# Patient Record
Sex: FEMALE | Race: BLACK OR AFRICAN AMERICAN | URBAN - METROPOLITAN AREA
[De-identification: names, ages, dates, MRNs, and addresses within clinical notes are randomized per-mention and may not be internally consistent; named-entity substitution may affect disease eponyms.]

---

## 2024-08-09 ENCOUNTER — OFFICE VISIT (OUTPATIENT)
Dept: GASTROENTEROLOGY | Facility: CLINIC | Age: 44
End: 2024-08-09

## 2024-08-09 ENCOUNTER — TELEPHONE (OUTPATIENT)
Facility: CLINIC | Age: 44
End: 2024-08-09

## 2024-08-09 VITALS
DIASTOLIC BLOOD PRESSURE: 97 MMHG | BODY MASS INDEX: 29.49 KG/M2 | HEART RATE: 60 BPM | WEIGHT: 177 LBS | SYSTOLIC BLOOD PRESSURE: 142 MMHG | HEIGHT: 65 IN

## 2024-08-09 DIAGNOSIS — Z12.11 SCREENING FOR COLON CANCER: Primary | ICD-10-CM

## 2024-08-09 DIAGNOSIS — K58.0 IRRITABLE BOWEL SYNDROME WITH DIARRHEA: ICD-10-CM

## 2024-08-09 DIAGNOSIS — K52.9 CHRONIC DIARRHEA: ICD-10-CM

## 2024-08-09 DIAGNOSIS — R11.0 NAUSEA: ICD-10-CM

## 2024-08-09 DIAGNOSIS — R19.7 DIARRHEA, UNSPECIFIED TYPE: Primary | ICD-10-CM

## 2024-08-09 DIAGNOSIS — R19.4 CHANGE IN BOWEL HABITS: ICD-10-CM

## 2024-08-09 PROCEDURE — 99204 OFFICE O/P NEW MOD 45 MIN: CPT | Performed by: INTERNAL MEDICINE

## 2024-08-09 RX ORDER — CYCLOBENZAPRINE HCL 5 MG
1 TABLET ORAL 3 TIMES DAILY PRN
COMMUNITY
Start: 2024-04-26

## 2024-08-09 RX ORDER — DULOXETIN HYDROCHLORIDE 60 MG/1
60 CAPSULE, DELAYED RELEASE ORAL DAILY
COMMUNITY
Start: 2024-03-25

## 2024-08-09 RX ORDER — IBUPROFEN 600 MG/1
1 TABLET ORAL EVERY 6 HOURS PRN
COMMUNITY
Start: 2024-01-16

## 2024-08-09 RX ORDER — ACETAMINOPHEN 325 MG/1
2 TABLET ORAL EVERY 4 HOURS PRN
COMMUNITY
Start: 2024-01-16

## 2024-08-09 RX ORDER — CELECOXIB 100 MG/1
1 CAPSULE ORAL 2 TIMES DAILY PRN
COMMUNITY
Start: 2024-05-21

## 2024-08-09 RX ORDER — ONDANSETRON 4 MG/1
4 TABLET, ORALLY DISINTEGRATING ORAL
COMMUNITY
Start: 2024-03-25

## 2024-08-09 RX ORDER — TAMOXIFEN CITRATE 20 MG/1
20 TABLET ORAL DAILY
COMMUNITY
Start: 2024-04-18

## 2024-08-09 RX ORDER — SODIUM, POTASSIUM,MAG SULFATES 17.5-3.13G
SOLUTION, RECONSTITUTED, ORAL ORAL
Qty: 1 EACH | Refills: 0 | Status: SHIPPED | OUTPATIENT
Start: 2024-08-09

## 2024-08-09 NOTE — PROGRESS NOTES
** Scroll down for HPI. **    ASSESSMENT/PLAN:     43 year old woman fit and healthy with BMI 29.45 who is recovering from a complex recent history of early breast cancer.    She underwent a mastectomy surgery January 2024 with onset of postsurgical pain approximately February 2024.  Pain has persisted despite nerve block treatments and medications including gabapentin.    She is on tamoxifen therapy.    Just came in about 3 weeks ago, July 2024 for removal of the expander, placement of breast implant.    The postoperative pain continues to be significant, but improved after the most recent block.  She continues to require gabapentin for control of her pain, recently unable to step down from the 900 mg dose to the 600 mg dose.    Today Ms. Hart comes in to review a change in bowel patterns, abdominal syndrome that preceded the breast cancer diagnosis.      Suggest:    Change in bowel patterns with recurring abdominal cramping and bloating, watery diarrhea  Symptom onset over 1 year ago  Recently treated for early breast cancer after that, mastectomy surgery January 2024  Aware of dairy intolerance and avoiding  Currently taking medications for nausea and pain including gabapentin  Some recent weight loss likely less than 10 pounds  Today we discussed possibilities ranging from irritable bowel syndrome-diarrhea which would be most likely to infectious or inflammatory process.  Stool studies ordered today including stool C. difficile, fecal calprotectin, Giardia assay  Trial of rifaximin antibiotic for IBS-diarrhea; Rx sent in today  Schedule colonoscopy examination to evaluate for inflammatory process.    2.  Nausea due to the above  Ms. Hart reports good relief on ondansetron medication.  Continue as needed.    3.  Colon cancer screening, average risk  No family history colorectal cancer  Never smoker  Colonoscopy examination as above and then likely in 5 years        Consent:    I recommended colonoscopy  examination with possible biopsy, possible polypectomy under MAC anesthesia . We discussed the nature and risks of colonoscopy examination including sedation, anesthesia risks; bleeding, colonic injury or perforation, infection.  We discussed the rare occurrence of missed lesions including missed polyps or missed malignancy with colonoscopy examination.  The patient understood these risks and agrees to proceed.  The need for an accompanying adult to provide a ride home or escort home was also discussed.    Suprep bowel prep if covered; otherwise GoLYTELY bowel prep          Office visit 8/9/2024:  HPI:    Patient ID: Xenia Hart is a 43 year old woman fit and healthy with BMI 29.45 who is recovering from a complex recent history of early breast cancer.    She underwent a mastectomy surgery January 2024 with onset of postsurgical pain approximately February 2024.  Pain has persisted despite nerve block treatments and medications including gabapentin.    She is on tamoxifen therapy.    Just came in about 3 weeks ago, July 2024 for removal of the expander, placement of breast implant.    The postoperative pain continues to be significant, but improved after the most recent block.  She continues to require gabapentin for control of her pain, recently unable to step down from the 900 mg dose to the 600 mg dose.    Today Ms. Hart comes in to review a change in bowel patterns, abdominal syndrome that preceded the breast cancer diagnosis.    Today Ms. Hart describes over 1 year history of waxing and waning diarrhea with associated abdominal cramping and bloating.    She describes watery diarrhea about 3-5 days/week.    On the days of diarrhea, this can be 1 or 2 watery bowel movements per day or repeated watery bowel movements all day long.    Mucus on the toilet paper, occasional streaks of blood in the toilet paper but no rafaela hematochezia.    Recent decrease appetite and intake.  Unclear whether this could relate  to other medications including gabapentin, tamoxifen.    Slight weight loss, from previous baseline 1  LBS to 177 on YN today.    On review of systems, she describes frequent nausea.  This is well-controlled on the Zofran.  There has been concern for iron deficiency anemia.    Ms. Hart did undergo a previous colonoscopy examination approximately age 27.  This is for an acute severe illness by her recollection.  Apparently colonoscopy was reassuring.  Illness did gradually improve.  She remembers it was a very slow recovery.    Family history includes a cousin with possible Crohn's disease.    Never smoker.      Recent labs have been reassuring.    CBC [facility] 6/24/2024 shows WBC 7100, hemoglobin 12.2g  BMP [facility] 6/24/2024 shows normal electrolytes and renal function  Hepatic function panel [facility] 6/24/2024 normal with AST 19 ALT 22 alk phos 72 serum albumin 4.2; normal serum lipase  Last iron profile 8/16/2023 showed iron deficiency    Wt Readings from Last 20 Encounters:   08/09/24 177 lb (80.3 kg)         Previous EGD examination: n  Previous colonoscopy(ies): Approx 2007    HPI    Review of Systems    ======================    [facility]    Yakov Florez MD - 03/13/2024    PROCEDURE:  CT CHEST W CONTRAST.    HISTORY:  Chest wall pain following a mastectomy and tissue expander placement.    TECHNIQUE:  Non-ionic contrast enhanced helical thoracic CT was performed.    COMPARISON:  None    FINDINGS:      Support Devices:  None.    Heart/Pericardium/Great Vessels:  Cardiac size is normal.  Calcific coronary artery atherosclerosis cannot be evaluated on this contrast enhanced exam.  There is no pericardial effusion.  The thoracic aorta is normal in diameter.  The main pulmonary artery is normal in diameter.    Pleural Spaces:  The pleural spaces are clear.    Mediastinum/Deepika:  There is no mediastinal or hilar lymph node  enlargement.    Neck Base/Chest Wall/Diaphragm/Upper Abdomen:  There is no supraclavicular or axillary lymph node enlargement.  Mild degenerative change is present in the spine. There are postsurgical changes of left mastectomy and bilateral tissue expander placement. There are surgical clips in the left chest wall and axilla. There is skin thickening of the left chest wall, which may represent postsurgical edema. No drainable fluid collection is identified. Few scattered diverticula of the visualized left colon.    Lungs/Central Airways:  The trachea and central airways are clear. There is no focal consolidation. No suspicious lung nodule.    CONCLUSIONS:      1.  Postsurgical changes of left mastectomy and bilateral tissue expander placement. Skin thickening of the left chest wall with underlying mild fat stranding, which may represent postsurgical edema but cellulitis is difficult to exclude. No drainable fluid collection identified.    FINAL REPORT  THE ATTENDING RADIOLOGIST INTERPRETED THIS STUDY WITH THE RESIDENT  WHOSE NAME APPEARS BELOW, AND FULLY AGREES WITH THE REPORT  AND HAS AMENDED THE REPORT WHEN NECESSARY:  Attending Radiologist:  Yakov Florez  Radiology Resident:  Azalea Smith MD  Date Signed Off:  03/13/2024 11:11  Exam End: 03/12/24  4:30 PM     Received From: [facility]    ======    [facility]    Shey Arias MD - 06/24/2024    PROCEDURE:  CTA CHEST PE.    HISTORY:  Evaluate for pulmonary embolism.  Syncope. History of breast cancer, status post mastectomy.    TECHNIQUE:  Initial pre-contrast  and localizer images were obtained.  Contrast enhanced helical CT pulmonary angiography was then performed.        COMPARISON:  CT chest with contrast 3/12/2024.    PULMONARY CTA FINDINGS:  This is a diagnostic quality helical CT pulmonary angiogram.  There is no acute pulmonary embolism to the first subsegmental pulmonary artery level.    OTHER  FINDINGS:      Support Devices:  None.    Heart/Pericardium/Great Vessels:         Cardiac size is normal.       Calcific coronary artery atherosclerosis cannot be accurately evaluated on this contrast-enhanced exam.       There is no pericardial effusion.       The thoracic aorta is normal in course and caliber.       The main pulmonary artery is normal in diameter.    Pleural Spaces:  The pleural spaces are clear.    Mediastinum/Deepika:  There is no mediastinal or hilar lymph node enlargement.    Neck Base/Chest Wall/Diaphragm/Upper Abdomen:  Status post left mastectomy with tissue expander in place.  Patient is also status post right breast augmentation with subpectoral prosthesis in place.  There is decreased skin thickening in the left chest wall and left breast, may be postsurgical in etiology. Multiple surgical clips in the left axillary region. Mild degenerative change is present in the spine.    Lungs/Central Airways:  The trachea and central airways are clear. No focal consolidation. No suspicious pulmonary nodule.      CONCLUSIONS:      1.  No acute pulmonary embolism to the first subsegmental pulmonary artery level.    2.  Lungs clear.    3.  Status post left mastectomy (with tissue expander in place) and right breast augmentation.  Decreased skin thickening in the left chest wall and left breast.  No fluid collection.        FINAL REPORT  THE ATTENDING RADIOLOGIST INTERPRETED THIS STUDY WITH THE RESIDENT  WHOSE NAME APPEARS BELOW, AND FULLY AGREES WITH THE REPORT  AND HAS AMENDED THE REPORT WHEN NECESSARY:  Attending Radiologist:  Shey Arias MD  Radiology Resident:  Faustino Robb MD  Date Signed Off:  06/24/2024 20:04  Exam End: 06/24/24  7:03 PM     Received From: [facility]              No current outpatient medications on file.         Allergies:  Allergies   Allergen Reactions    Latex HIVES     Imaging: No results found.       PHYSICAL EXAM:   Physical Exam                    Over 45 minutes spent today discussing the above and counseling and educating this patient, reviewing chart notes and data and documenting clinical information in the EHR, independently interpreting results and communicating results to the patient/family and composing this comprehensive note, ordering medications or testing, referring and communicating with other healthcare providers, and care coordination with the patient's other providers.      Digital transcription software was utilized to produce this note. The note was proofread for content only. Typographical errors may remain.       Meds This Visit:  Requested Prescriptions      No prescriptions requested or ordered in this encounter       Imaging & Referrals:  None       ID#[id number]

## 2024-08-09 NOTE — TELEPHONE ENCOUNTER
Attn PPD Prior Auth:    Patient is scheduled for Colonoscopy 14831 on 8/13/2024. Please obtain prior authorization.    Thanks!

## 2024-08-09 NOTE — PATIENT INSTRUCTIONS
GI schedulers -    Please schedule colonoscopy exam at [facility] ([facility])    BMI Readings from Last 1 Encounters:   08/09/24 29.45 kg/m²     MAC anesthesia     BP Readings from Last 5 Encounters:   08/09/24 (!) 142/97       Suprep small volume bowel prep if covered by insurance, otherwise   Golytely (PEG) 4L bowel prep  Rx sent in to [facility]      DX = Screening colonoscopy, change bowel habits, chronic diarrhea    Medication instructions:    None

## 2024-08-09 NOTE — TELEPHONE ENCOUNTER
Scheduled for:  Colonoscopy 75422  Provider Name:    Date:  8/13/2024  Location:  [facility]  Sedation:  MAC  Time:  8:00 am, (pt is aware that Endoscopy desk will call the day before to confirm arrival time)  Prep: Golytely   Meds/Allergies Reconciled?:  Physician reviewed  Diagnosis with codes:  Screening for Colon Cancer Z12.11, Change In Bowel Habits R19.4, Chronic Diarrhea K52.9  Was patient informed to call insurance with codes (Y/N):  Yes  Referral sent?:  Referral was sent at the time of electronic surgical scheduling.  [facility] or [facility] notified?:   I sent an electronic request to Endo Scheduling and received a confirmation today.  Medication Orders:  Patient is aware to NOT take iron pills, herbal meds and diet supplements for 7 days before exam. Also to NOT take any form of alcohol, recreational drugs and any forms of ED meds 24-72 hours before exam.   Misc Orders:  N/A     Further instructions given by staff: I discussed the prep instructions with the patient which she verbally understood. Provided patient with prep instructions and cancellation policy at the time of office visit.

## 2024-08-13 ENCOUNTER — HOSPITAL ENCOUNTER (OUTPATIENT)
Facility: HOSPITAL | Age: 44
Setting detail: HOSPITAL OUTPATIENT SURGERY
Discharge: HOME | End: 2024-08-13
Attending: INTERNAL MEDICINE | Admitting: INTERNAL MEDICINE
Payer: COMMERCIAL

## 2024-08-13 ENCOUNTER — ANESTHESIA (OUTPATIENT)
Dept: ENDOSCOPY | Facility: HOSPITAL | Age: 44
End: 2024-08-13
Payer: COMMERCIAL

## 2024-08-13 ENCOUNTER — ANESTHESIA EVENT (OUTPATIENT)
Dept: ENDOSCOPY | Facility: HOSPITAL | Age: 44
End: 2024-08-13
Payer: COMMERCIAL

## 2024-08-13 VITALS
OXYGEN SATURATION: 98 % | HEIGHT: 65 IN | RESPIRATION RATE: 22 BRPM | TEMPERATURE: 98 F | HEART RATE: 71 BPM | BODY MASS INDEX: 29.49 KG/M2 | WEIGHT: 177 LBS | DIASTOLIC BLOOD PRESSURE: 88 MMHG | SYSTOLIC BLOOD PRESSURE: 105 MMHG

## 2024-08-13 DIAGNOSIS — Z12.11 SCREENING FOR COLON CANCER: ICD-10-CM

## 2024-08-13 DIAGNOSIS — R19.4 CHANGE IN BOWEL HABITS: ICD-10-CM

## 2024-08-13 DIAGNOSIS — K52.9 CHRONIC DIARRHEA: ICD-10-CM

## 2024-08-13 LAB — B-HCG UR QL: NEGATIVE

## 2024-08-13 PROCEDURE — 0DBN8ZX EXCISION OF SIGMOID COLON, VIA NATURAL OR ARTIFICIAL OPENING ENDOSCOPIC, DIAGNOSTIC: ICD-10-PCS | Performed by: INTERNAL MEDICINE

## 2024-08-13 PROCEDURE — 0DBP8ZX EXCISION OF RECTUM, VIA NATURAL OR ARTIFICIAL OPENING ENDOSCOPIC, DIAGNOSTIC: ICD-10-PCS | Performed by: INTERNAL MEDICINE

## 2024-08-13 PROCEDURE — 0DBL8ZX EXCISION OF TRANSVERSE COLON, VIA NATURAL OR ARTIFICIAL OPENING ENDOSCOPIC, DIAGNOSTIC: ICD-10-PCS | Performed by: INTERNAL MEDICINE

## 2024-08-13 PROCEDURE — 45380 COLONOSCOPY AND BIOPSY: CPT | Performed by: INTERNAL MEDICINE

## 2024-08-13 PROCEDURE — 0DBB8ZX EXCISION OF ILEUM, VIA NATURAL OR ARTIFICIAL OPENING ENDOSCOPIC, DIAGNOSTIC: ICD-10-PCS | Performed by: INTERNAL MEDICINE

## 2024-08-13 RX ORDER — NALOXONE HYDROCHLORIDE 0.4 MG/ML
0.08 INJECTION, SOLUTION INTRAMUSCULAR; INTRAVENOUS; SUBCUTANEOUS ONCE AS NEEDED
Status: DISCONTINUED | OUTPATIENT
Start: 2024-08-13 | End: 2024-08-13

## 2024-08-13 RX ORDER — LIDOCAINE HYDROCHLORIDE 10 MG/ML
INJECTION, SOLUTION EPIDURAL; INFILTRATION; INTRACAUDAL; PERINEURAL AS NEEDED
Status: DISCONTINUED | OUTPATIENT
Start: 2024-08-13 | End: 2024-08-13 | Stop reason: SURG

## 2024-08-13 RX ORDER — SODIUM CHLORIDE, SODIUM LACTATE, POTASSIUM CHLORIDE, CALCIUM CHLORIDE 600; 310; 30; 20 MG/100ML; MG/100ML; MG/100ML; MG/100ML
INJECTION, SOLUTION INTRAVENOUS CONTINUOUS
Status: DISCONTINUED | OUTPATIENT
Start: 2024-08-13 | End: 2024-08-13

## 2024-08-13 RX ADMIN — LIDOCAINE HYDROCHLORIDE 50 MG: 10 INJECTION, SOLUTION EPIDURAL; INFILTRATION; INTRACAUDAL; PERINEURAL at 08:03:00

## 2024-08-13 RX ADMIN — SODIUM CHLORIDE, SODIUM LACTATE, POTASSIUM CHLORIDE, CALCIUM CHLORIDE: 600; 310; 30; 20 INJECTION, SOLUTION INTRAVENOUS at 08:03:00

## 2024-08-13 RX ADMIN — SODIUM CHLORIDE, SODIUM LACTATE, POTASSIUM CHLORIDE, CALCIUM CHLORIDE: 600; 310; 30; 20 INJECTION, SOLUTION INTRAVENOUS at 08:33:00

## 2024-08-13 NOTE — H&P
History & Physical Examination    Patient Name: Xenia Hart  MRN: [id number]  [facility]: [id number]  YOB: 1980    Diagnosis: Screening colonoscopy examination, change in bowel habits, chronic diarrhea    PRESENT ILLNESS: Strong healthy 43-year-old woman BMI 29.5 recent history of breast cancer who presents for elective colonoscopy examination.      BMI Readings from Last 1 Encounters:   08/10/24 29.45 kg/m²         Medications Prior to Admission   Medication Sig Dispense Refill Last Dose    acetaminophen 325 MG Oral Tab Take 2 tablets (650 mg total) by mouth every 4 (four) hours as needed.    at prn    celecoxib 100 MG Oral Cap Take 1 capsule (100 mg total) by mouth 2 (two) times daily as needed.   8/12/2024    cyclobenzaprine 5 MG Oral Tab Take 1 tablet (5 mg total) by mouth 3 (three) times daily as needed.       DULoxetine 60 MG Oral Cap DR Particles Take 1 capsule (60 mg total) by mouth daily.       Gabapentin, Once-Daily, 900 MG Oral Tab Take 900 mg by mouth As Directed.   8/12/2024    ibuprofen 600 MG Oral Tab Take 1 tablet (600 mg total) by mouth every 6 (six) hours as needed.       ondansetron 4 MG Oral Tablet Dispersible Take 1 tablet (4 mg total) by mouth.       tamoxifen 20 MG Oral Tab Take 1 tablet (20 mg total) by mouth daily.   8/12/2024    rifAXIMin 550 MG Oral Tab Take 1 tablet (550 mg total) by mouth 3 (three) times daily for 14 days. 42 tablet 1     Na Sulfate-K Sulfate-Mg Sulf (SUPREP BOWEL PREP KIT) 17.5-3.13-1.6 GM/177ML Oral Solution Take as directed 1 each 0      Current Facility-Administered Medications   Medication Dose Route Frequency    lactated ringers infusion   Intravenous Continuous       Allergies:   Allergies   Allergen Reactions    Latex HIVES       Past Medical History:    Breast cancer (HCC)    PONV (postoperative nausea and vomiting)    Post-mastectomy pain syndrome     Past Surgical History:   Procedure Laterality Date    Knee surgery Right 2010    Mastectomy  left  01/2024     Family History   Problem Relation Age of Onset    Other (Other) Father     Other (Other) Mother     Other (Other) Brother     Other (Other) Brother      Social History     Tobacco Use    Smoking status: Never     Passive exposure: Never    Smokeless tobacco: Never   Substance Use Topics    Alcohol use: Never       SYSTEM Check if Review is Normal Check if Physical Exam is Normal If not normal, please explain:   HEENT [ ] [X ]    NECK & BACK [ ] [ ]    HEART [ X ] [ X ]    LUNGS [ X ] [ X ]    ABDOMEN [ X ] [ X ]    UROGENITAL [ ] [ ]    EXTREMITIES [ ] [ ]    OTHER        See Anesthesia documentation    [ x ] I have discussed the risks and benefits and alternatives with the patient/family.  They understand and agree to proceed with plan of care.  [ x ] I have reviewed the History and Physical done within the last 30 days.  Any changes noted above.    Ac Antonio MD  8/13/2024  7:58 AM

## 2024-08-13 NOTE — DISCHARGE INSTRUCTIONS
.  .  .  Notes from Dr. Antonio:    Healthy colonoscopy examination today.  I did not see any colitis or inflammation.  I took random samples or biopsies of the lining of your \"ileum\" or end of the small intestine as well as the lining of the colon or large intestine.  That is to look for celiac disease or colitis/inflammation.  I saw a very mild early \"diverticuLOSIS\" (pockets) of the wall of the colon - very common. You should be provided a handout regarding diet and the possible risk of diverticulitis/infection  Very small internal hemorrhoids only on today's colonoscopy exam.  Those may cause occasional blood streaks but are not a problem.  NO colon polyps today.  Great news.  If you are raw and irritated back there after all of that diarrhea and wiping, three good options to soothe and heal the irritation include Aquaphor ointment, \"Desitin\" or \"A & D\" diaper ointment, or good old-fashioned Vaseline.  All of these soothe and create a protective barrier so that your skin can heal.  Please proceed with a trial of that rifaximin antibiotic to see if that helps with the diarrhea.  Repeat colonoscopy exam or other colon cancer screening in 5 years.  .  .  .

## 2024-08-13 NOTE — ANESTHESIA POSTPROCEDURE EVALUATION
Patient: Xenia Torreston    Procedure Summary       Date: 08/13/24 Room / Location: [facility] ENDOSCOPY 05 / [facility] ENDOSCOPY    Anesthesia Start: 0757 Anesthesia Stop:     Procedure: COLONOSCOPY Diagnosis:       Screening for colon cancer      Change in bowel habits      Chronic diarrhea      (Screening for colon cancer/ Change in bowel habits/ Chronic diarrhea)    Surgeons: Ac Antonio MD Anesthesiologist: Jassi Dennis CRNA    Anesthesia Type: MAC ASA Status: 2            Anesthesia Type: MAC    Vitals Value Taken Time   /58 08/13/24 0836   Temp 98 °F (36.7 °C) 08/13/24 0836   Pulse 71 08/13/24 0837   Resp 22 08/13/24 0837   SpO2 100 % 08/13/24 0837   Vitals shown include unfiled device data.    [facility] AN Post Evaluation:   Patient Evaluated in PACU  Patient Participation: complete - patient participated  Level of Consciousness: sleepy but conscious  Pain Score: 0  Pain Management: adequate  Airway Patency:patent  Dental exam unchanged from preop  Yes    Cardiovascular Status: stable and acceptable  Respiratory Status: acceptable and room air  Postoperative Hydration acceptable      JASSI DENNIS CRNA  8/13/2024 8:37 AM

## 2024-08-13 NOTE — OPERATIVE REPORT
[facility]    COLONOSCOPY PROCEDURE REPORT     DATE OF PROCEDURE:  8/13/2024     PCP: Cara Reyes MD     PREOPERATIVE DIAGNOSIS:  Screening colonoscopy examination, change in bowel habits, chronic diarrhea     POSTOPERATIVE DIAGNOSIS:  See impression.     SURGEON:  Ac Antonio M.D.     SEDATION:    MAC anesthesia provided by the Anesthesia Service.  MAC anesthesia requested due to  anticipated intolerance of colonoscopy examination under safe doses conscious sedation medications     COLONOSCOPY PROCEDURE:   After the nature and risks of colonoscopy examination under MAC anesthesia were discussed with the patient and all questions answered, informed consent was obtained.  The patient was sedated as above.      Digital rectal exam was performed which showed no masses.  The Olympus pediatric video colonoscope was placed in the patient's rectum and advanced under direct visualization through the entire length of the colon up to the cecum and terminal ileum.  Retroflex exam performed up the ascending colon to the hepatic flexure.  The cecum was confirmed by landmarks including appendiceal orifice, cecal trifold, ileocecal valve.  Retroflexion was performed in the rectum.    The quality of the prep was excellent.    Estimated blood loss: none/insignificant       COLONOSCOPY FINDINGS:    Healthy and normal colonic mucosa entire length of the colon from the anal verge up to the cecum and ileocecal valve.  Random colonic mucosal biopsies obtained transverse colon and sigmoid colon.  Normal ICV and terminal ileum mucosa examined at least 15 cm proximal to the ileocecal valve.  Random ileal mucosal biopsies obtained.  Occasional, rare diverticuli ascending and sigmoid colon.  Medium sized internal hemorrhoids.      RECOMMENDATIONS:  High fiber diet.  Follow-up above random colonic and ileal mucosal biopsies.    Repeat colonoscopy examination in 5 years for colon cancer screening.  No aspirin or  NSAID medications for next 5 days to prevent bleeding.  Proceed with trial of rifaximin antibiotic as discussed last week during our consultation for the recent chronic diarrhea.

## 2024-08-13 NOTE — ANESTHESIA PREPROCEDURE EVALUATION
Anesthesia PreOp Note    HPI:     Xenia Hart is a 43 year old female who presents for preoperative consultation requested by: Ac Antonio MD    Date of Surgery: 8/13/2024    Procedure(s):  COLONOSCOPY  Indication: Screening for colon cancer/ Change in bowel habits/ Chronic diarrhea    Relevant Problems   No relevant active problems       NPO:  Last Liquid Consumption Date: 08/13/24  Last Liquid Consumption Time: 0300  Last Solid Consumption Date: 08/12/24  Last Solid Consumption Time: 0800  Last Liquid Consumption Date: 08/13/24          History Review:  There are no problems to display for this patient.      Past Medical History:    Breast cancer (HCC)    PONV (postoperative nausea and vomiting)    Post-mastectomy pain syndrome       Past Surgical History:   Procedure Laterality Date    Knee surgery Right 2010    Mastectomy left  01/2024       Medications Prior to Admission   Medication Sig Dispense Refill Last Dose    acetaminophen 325 MG Oral Tab Take 2 tablets (650 mg total) by mouth every 4 (four) hours as needed.    at prn    celecoxib 100 MG Oral Cap Take 1 capsule (100 mg total) by mouth 2 (two) times daily as needed.   8/12/2024    cyclobenzaprine 5 MG Oral Tab Take 1 tablet (5 mg total) by mouth 3 (three) times daily as needed.       DULoxetine 60 MG Oral Cap DR Particles Take 1 capsule (60 mg total) by mouth daily.       Gabapentin, Once-Daily, 900 MG Oral Tab Take 900 mg by mouth As Directed.   8/12/2024    ibuprofen 600 MG Oral Tab Take 1 tablet (600 mg total) by mouth every 6 (six) hours as needed.       ondansetron 4 MG Oral Tablet Dispersible Take 1 tablet (4 mg total) by mouth.       tamoxifen 20 MG Oral Tab Take 1 tablet (20 mg total) by mouth daily.   8/12/2024    rifAXIMin 550 MG Oral Tab Take 1 tablet (550 mg total) by mouth 3 (three) times daily for 14 days. 42 tablet 1     Na Sulfate-K Sulfate-Mg Sulf (SUPREP BOWEL PREP KIT) 17.5-3.13-1.6 GM/177ML Oral Solution Take  as directed 1 each 0      Current Facility-Administered Medications Ordered in Epic   Medication Dose Route Frequency Provider Last Rate Last Admin    lactated ringers infusion   Intravenous Continuous Ac Antonio MD         No current [facility]-ordered outpatient medications on file.       Allergies   Allergen Reactions    Latex HIVES       Family History   Problem Relation Age of Onset    Other (Other) Father     Other (Other) Mother     Other (Other) Brother     Other (Other) Brother      Social History     Socioeconomic History    Marital status: Single   Tobacco Use    Smoking status: Never     Passive exposure: Never    Smokeless tobacco: Never   Substance and Sexual Activity    Alcohol use: Never    Drug use: Yes     Types: Cannabis     Comment: last use sunday 8/11       Available pre-op labs reviewed.             Vital Signs:  Body mass index is 29.45 kg/m².   height is 1.651 m (5' 5\") and weight is 80.3 kg (177 lb). Her blood pressure is 136/81 and her pulse is 67. Her respiration is 15 and oxygen saturation is 100%.   Vitals:    08/10/24 0913 08/13/24 0727   BP:  136/81   Pulse:  67   Resp:  15   SpO2:  100%   Weight: 80.3 kg (177 lb)    Height: 1.651 m (5' 5\")         Anesthesia Evaluation     Patient summary reviewed and Nursing notes reviewed    History of anesthetic complications (nausea)   Airway   Mallampati: I  TM distance: >3 FB  Neck ROM: full  Dental - Dentition appears grossly intact     Pulmonary - negative ROS and normal exam    breath sounds clear to auscultation  Cardiovascular - negative ROS and normal exam    Neuro/Psych - negative ROS     GI/Hepatic/Renal - negative ROS     Endo/Other - negative ROS   Abdominal  - normal exam                 Anesthesia Plan:   ASA:  2  Plan:   MAC  Informed Consent Plan and Risks Discussed With:  Patient  Discussed plan with:  Surgeon      I have informed Xenia Hart and/or legal guardian or family member of the nature of the  anesthetic plan, benefits, risks including possible dental damage if relevant, major complications, and any alternative forms of anesthetic management.   All of the patient's questions were answered to the best of my ability. The patient desires the anesthetic management as planned.  JASSI CASIANO CRNA  8/13/2024 7:34 AM  Present on Admission:  **None**

## 2024-09-12 ENCOUNTER — LAB ENCOUNTER (OUTPATIENT)
Dept: LAB | Facility: REFERENCE LAB | Age: 44
End: 2024-09-12
Attending: INTERNAL MEDICINE
Payer: COMMERCIAL

## 2024-09-12 DIAGNOSIS — R19.7 DIARRHEA, UNSPECIFIED TYPE: ICD-10-CM

## 2024-09-12 LAB
CRYPTOSP AG STL QL IA: NEGATIVE
G LAMBLIA AG STL QL IA: NEGATIVE

## 2024-09-12 PROCEDURE — 87329 GIARDIA AG IA: CPT

## 2024-09-12 PROCEDURE — 83993 ASSAY FOR CALPROTECTIN FECAL: CPT

## 2024-09-12 PROCEDURE — 87272 CRYPTOSPORIDIUM AG IF: CPT

## 2024-09-12 PROCEDURE — 87493 C DIFF AMPLIFIED PROBE: CPT

## 2024-09-13 LAB — C DIFF TOX B STL QL: NEGATIVE

## 2024-09-14 LAB — CALPROTECTIN STL-MCNT: 7.87 ΜG/G (ref ?–50)

## 2024-09-17 ENCOUNTER — TELEPHONE (OUTPATIENT)
Facility: CLINIC | Age: 44
End: 2024-09-17

## 2024-09-17 NOTE — TELEPHONE ENCOUNTER
----- Message from Ac Antonio sent at 9/15/2024  8:10 PM CDT -----  Marcy Lynne, could you recall for repeat colonoscopy exam in 5 years?    - cb

## 2024-09-18 NOTE — TELEPHONE ENCOUNTER
5  year colonoscopy recall entered. Health maintenance updated.    Colonoscopy done on 8/13/24 and next due on 8/13/29.

## (undated) DEVICE — KIT CLEAN ENDOKIT 1.1OZ GOWNX2

## (undated) DEVICE — GIJAW SINGLE-USE BIOPSY FORCEPS WITH NEEDLE: Brand: GIJAW

## (undated) DEVICE — CO2 CANNULA,SSOFT,ADLT,7O2,4CO2,FEMALE: Brand: MEDLINE

## (undated) DEVICE — KIT ENDO ORCAPOD 160/180/190

## (undated) DEVICE — SYRINGE, LUER SLIP, STERILE, 60ML: Brand: MEDLINE

## (undated) DEVICE — STERILE LATEX POWDER-FREE SURGICAL GLOVESWITH NITRILE COATING: Brand: PROTEXIS

## (undated) DEVICE — MEDI-VAC NON-CONDUCTIVE SUCTION TUBING 6MM X 1.8M (6FT.) L: Brand: CARDINAL HEALTH

## (undated) NOTE — LETTER
[facility]  Administration of Anesthesia  Xenia ARIAS agree to be cared for by a physician anesthesiologist alone and/or with a nurse anesthetist, who is specially trained to monitor me and give me medicine to put me to sleep or keep me comfortable during my procedure    I understand that my anesthesiologist and/or anesthetist is not an employee or agent of [facility] or [employer]. He or she works for [facility]    As the patient asking for anesthesia services, I agree to:  Allow the anesthesiologist (anesthesia doctor) to give me medicine and do additional procedures as necessary. Some examples are: Starting or using an “IV” to give me medicine, fluids or blood during my procedure, and having a breathing tube placed to help me breathe when I’m asleep (intubation). In the event that my heart stops working properly, I understand that my anesthesiologist will make every effort to sustain my life, unless otherwise directed by [facility] Do Not Resuscitate documents.  Tell my anesthesia doctor before my procedure:  If I am pregnant.  The last time that I ate or drank.  iii. All of the medicines I take (including prescriptions, herbal supplements, and pills I can buy without a prescription (including street drugs/illegal medications). Failure to inform my anesthesiologist about these medicines may increase my risk of anesthetic complications.  iv.If I am allergic to anything or have had a reaction to anesthesia before.  I understand how the anesthesia medicine will help me (benefits).  I understand that with any type of anesthesia medicine there are risks:  The most common risks are: nausea, vomiting, sore throat, muscle soreness, damage to my eyes, mouth, or teeth (from breathing tube placement).  Rare risks include: remembering what happened during my procedure, allergic reactions to medications, injury to my airway, heart, lungs, vision, nerves, or  muscles and in extremely rare instances death.  My doctor has explained to me other choices available to me for my care (alternatives).  Pregnant Patients (“epidural”):  I understand that the risks of having an epidural (medicine given into my back to help control pain during labor), include itching, low blood pressure, difficulty urinating, headache or slowing of the baby’s heart. Very rare risks include infection, bleeding, seizure, irregular heart rhythms and nerve injury.  Regional Anesthesia (“spinal”, “epidural”, & “nerve blocks”):  I understand that rare but potential complications include headache, bleeding, infection, seizure, irregular heart rhythms, and nerve injury.    _____________________________________________________________________________  Patient (or Representative) Signature/Relationship to Patient  Date   Time    _____________________________________________________________________________   Name (if used)    Language/Organization   Time    _____________________________________________________________________________  Nurse Anesthetist Signature     Date   Time  _____________________________________________________________________________  Anesthesiologist Signature     Date   Time  I have discussed the procedure and information above with the patient (or patient’s representative) and answered their questions. The patient or their representative has agreed to have anesthesia services.    _____________________________________________________________________________  Witness        Date   Time  I have verified that the signature is that of the patient or patient’s representative, and that it was signed before the procedure  Patient Name: Xenia Hart     : 1980                 Printed: 2024 at 2:24 PM    Medical Record #: [id number]                                            Page 1 of 1  ----------ANESTHESIA CONSENT----------

## (undated) NOTE — LETTER
[facility]  [address], [place], [place]    Authorization for Surgical Operation and Procedure                               I hereby authorize Ac Antonio MD, my physician and his/her assistants (if applicable), which may include medical students, residents, and/or fellows, to perform the following surgical operation/ procedure and administer such anesthesia as may be determined necessary by my physician: Operation/Procedure name (s) COLONOSCOPY on Tameadu Hart   2.   I recognize that during the surgical operation/procedure, unforeseen conditions may necessitate additional or different procedures than those listed above.  I, therefore, further authorize and request that the above-named surgeon, assistants, or designees perform such procedures as are, in their judgment, necessary and desirable.    3.   My surgeon/physician has discussed prior to my surgery the potential benefits, risks and side effects of this procedure; the likelihood of achieving goals; and potential problems that might occur during recuperation.  They also discussed reasonable alternatives to the procedure, including risks, benefits, and side effects related to the alternatives and risks related to not receiving this procedure.  I have had all my questions answered and I acknowledge that no guarantee has been made as to the result that may be obtained.    4.   Should the need arise during my operation/procedure, which includes change of level of care prior to discharge, I also consent to the administration of blood and/or blood products.  Further, I understand that despite careful testing and screening of blood or blood products by collecting agencies, I may still be subject to ill effects as a result of receiving a blood transfusion and/or blood products.  The following are some, but not all, of the potential risks that can occur: fever and allergic reactions, hemolytic reactions, transmission of diseases  such as Hepatitis, AIDS and Cytomegalovirus (CMV) and fluid overload.  In the event that I wish to have an autologous transfusion of my own blood, or a directed donor transfusion, I will discuss this with my physician.  Check only if Refusing Blood or Blood Products  I understand refusal of blood or blood products as deemed necessary by my physician may have serious consequences to my condition to include possible death. I hereby assume responsibility for my refusal and release the hospital, its personnel, and my physicians from any responsibility for the consequences of my refusal.    o  Refuse   5.   I authorize the use of any specimen, organs, tissues, body parts or foreign objects that may be removed from my body during the operation/procedure for diagnosis, research or teaching purposes and their subsequent disposal by hospital authorities.  I also authorize the release of specimen test results and/or written reports to my treating physician on the hospital medical staff or other referring or consulting physicians involved in my care, at the discretion of the Pathologist or my treating physician.    6.   I consent to the photographing or videotaping of the operations or procedures to be performed, including appropriate portions of my body for medical, scientific, or educational purposes, provided my identity is not revealed by the pictures or by descriptive texts accompanying them.  If the procedure has been photographed/videotaped, the surgeon will obtain the original picture, image, videotape or CD.  The hospital will not be responsible for storage, release or maintenance of the picture, image, tape or CD.    7.   I consent to the presence of a  or observers in the operating room as deemed necessary by my physician or their designees.    8.   I recognize that in the event my procedure results in extended X-Ray/fluoroscopy time, I may develop a skin reaction.    9. If I have a Do Not Attempt  Resuscitation (DNAR) order in place, that status will be suspended while in the operating room, procedural suite, and during the recovery period unless otherwise explicitly stated by me (or a person authorized to consent on my behalf). The surgeon or my attending physician will determine when the applicable recovery period ends for purposes of reinstating the DNAR order.  10. Patients having a sterilization procedure: I understand that if the procedure is successful the results will be permanent and it will therefore be impossible for me to inseminate, conceive, or bear children.  I also understand that the procedure is intended to result in sterility, although the result has not been guaranteed.   11. I acknowledge that my physician has explained sedation/analgesia administration to me including the risk and benefits I consent to the administration of sedation/analgesia as may be necessary or desirable in the judgment of my physician.    I CERTIFY THAT I HAVE READ AND FULLY UNDERSTAND THE ABOVE CONSENT TO OPERATION and/or OTHER PROCEDURE.     ____________________________________  _________________________________        ______________________________  Signature of Patient    Signature of Responsible Person                Printed Name of Responsible Person                                      ____________________________________  _____________________________                ________________________________  Signature of Witness        Date  Time         Relationship to Patient    STATEMENT OF PHYSICIAN My signature below affirms that prior to the time of the procedure; I have explained to the patient and/or his/her legal representative, the risks and benefits involved in the proposed treatment and any reasonable alternative to the proposed treatment. I have also explained the risks and benefits involved in refusal of the proposed treatment and alternatives to the proposed treatment and have answered the patient's  questions. If I have a significant financial interest in a co-management agreement or a significant financial interest in any product or implant, or other significant relationship used in this procedure/surgery, I have disclosed this and had a discussion with my patient.     _____________________________________________________              _____________________________  (Signature of Physician)                                                                                         (Date)                                   (Time)  Patient Name: Xenia Hart      : 1980      Printed: 2024     Medical Record #: [id number]                                      Page 1 of 1